# Patient Record
Sex: FEMALE | Race: OTHER
[De-identification: names, ages, dates, MRNs, and addresses within clinical notes are randomized per-mention and may not be internally consistent; named-entity substitution may affect disease eponyms.]

---

## 2018-11-14 ENCOUNTER — HOSPITAL ENCOUNTER (OUTPATIENT)
Dept: HOSPITAL 72 - SUR | Age: 78
Discharge: HOME | End: 2018-11-14
Payer: COMMERCIAL

## 2018-11-14 VITALS — DIASTOLIC BLOOD PRESSURE: 54 MMHG | SYSTOLIC BLOOD PRESSURE: 134 MMHG

## 2018-11-14 VITALS — DIASTOLIC BLOOD PRESSURE: 72 MMHG | SYSTOLIC BLOOD PRESSURE: 147 MMHG

## 2018-11-14 VITALS — SYSTOLIC BLOOD PRESSURE: 130 MMHG | DIASTOLIC BLOOD PRESSURE: 58 MMHG

## 2018-11-14 VITALS — DIASTOLIC BLOOD PRESSURE: 61 MMHG | SYSTOLIC BLOOD PRESSURE: 130 MMHG

## 2018-11-14 VITALS — HEIGHT: 57 IN | WEIGHT: 125 LBS | BODY MASS INDEX: 26.97 KG/M2

## 2018-11-14 VITALS — SYSTOLIC BLOOD PRESSURE: 123 MMHG | DIASTOLIC BLOOD PRESSURE: 54 MMHG

## 2018-11-14 VITALS — DIASTOLIC BLOOD PRESSURE: 51 MMHG | SYSTOLIC BLOOD PRESSURE: 130 MMHG

## 2018-11-14 VITALS — DIASTOLIC BLOOD PRESSURE: 58 MMHG | SYSTOLIC BLOOD PRESSURE: 128 MMHG

## 2018-11-14 DIAGNOSIS — I10: ICD-10-CM

## 2018-11-14 DIAGNOSIS — Z88.6: ICD-10-CM

## 2018-11-14 DIAGNOSIS — H35.342: Primary | ICD-10-CM

## 2018-11-14 DIAGNOSIS — Z90.710: ICD-10-CM

## 2018-11-14 DIAGNOSIS — Z90.89: ICD-10-CM

## 2018-11-14 DIAGNOSIS — M19.90: ICD-10-CM

## 2018-11-14 PROCEDURE — 94003 VENT MGMT INPAT SUBQ DAY: CPT

## 2018-11-14 PROCEDURE — 94150 VITAL CAPACITY TEST: CPT

## 2018-11-14 PROCEDURE — 67042 VIT FOR MACULAR HOLE: CPT

## 2018-11-14 RX ADMIN — CYCLOPENTOLATE HYDROCHLORIDE SCH DROP: 10 SOLUTION OPHTHALMIC at 10:55

## 2018-11-14 RX ADMIN — PHENYLEPHRINE HYDROCHLORIDE SCH DROP: 25 SOLUTION/ DROPS OPHTHALMIC at 11:00

## 2018-11-14 RX ADMIN — CYCLOPENTOLATE HYDROCHLORIDE SCH DROP: 10 SOLUTION OPHTHALMIC at 11:00

## 2018-11-14 RX ADMIN — PHENYLEPHRINE HYDROCHLORIDE SCH DROP: 25 SOLUTION/ DROPS OPHTHALMIC at 10:44

## 2018-11-14 RX ADMIN — PHENYLEPHRINE HYDROCHLORIDE SCH DROP: 25 SOLUTION/ DROPS OPHTHALMIC at 10:55

## 2018-11-14 RX ADMIN — CYCLOPENTOLATE HYDROCHLORIDE SCH DROP: 10 SOLUTION OPHTHALMIC at 10:44

## 2018-11-14 NOTE — PRE-PROCEDURE NOTE/ATTESTATION
Pre-Procedure Note/Attestation


Complete Prior to Procedure


Planned Procedure:  left


Procedure Narrative:


23G PPV/MP/Gas left eye





Indications for Procedure


Pre-Operative Diagnosis:


macular hole left eye





Attestation


I attest that I discussed the nature of the procedure; its benefits; risks and 

complications; and alternatives (and the risks and benefits of such alternatives

), prior to the procedure, with the patient (or the patient's legal 

representative).





I attest that, if there was a reasonable possibility of needing a blood 

transfusion, the patient (or the patient's legal representative) was given the 

[facility] standardized written summary, pursuant 

to the Daryl [place] Blood Safety Act ([place] Health and Safety Code # 1645, as 

amended).





I attest that I re-evaluated the patient just prior to the surgery and that 

there has been no change in the patient's H&P, except as documented below:











Andrew Vazquez MD Nov 14, 2018 11:19

## 2018-11-14 NOTE — IMMEDIATE POST-OP EVALUATION
Immediate Post-Op Evalulation


Immediate Post-Op Evalulation


Procedure:  LEFT eye vitrectomy, membrane stripping, gas injection


Date of Evaluation:  Nov 14, 2018


Time of Evaluation:  14:25


IV Fluids:   ml


Blood Pressure Systolic:  130


Blood Pressure Diastolic:  58


Pulse Rate:  72


Respiratory Rate:  22


O2 Sat by Pulse Oximetry:  100


Temperature (Fahrenheit):  98.5


Pain Score (1-10):  0


Nausea:  No


Vomiting:  No


Complications


none


Patient Status:  awake, reacts, patent


Hydration Status:  adequate


Given Within 1 Hr of Incision:  Aaliyah Nino CRNA Nov 14, 2018 14:32

## 2018-11-14 NOTE — BRIEF OPERATIVE NOTE
Immediate Post Operative Note


Operative Note


Chief Complaint:  blurred vision left eye


Pre-op Diagnosis:


macular hole left eye


Procedure:


23G PPV/MP/20%SF6 left eye


Post-op Diagnosis:


macular hole left eye


Post-op Diagnosis:  same as pre-op


Findings:  consistent w/pre-op dx studies


Surgeon:  Andrew Vazquez


Anesthesia:  local


Specimen:  none


Complications:  none


Condition:  stable


Fluids:  <500cc


Estimated Blood Loss:  none


Drains:  none


Implant(s) used?:  Andrew Lepe MD Nov 14, 2018 14:27

## 2018-11-14 NOTE — ANETHESIA PREOPERATIVE EVAL
Anesthesia Pre-op PMH/ROS


General


Date of Evaluation:  Nov 14, 2018


Time of Evaluation:  12:33


Anesthesiologist:  Aaliyah Webb CRNA


ASA Score:  ASA 2


Mallampati Score


Class I : Soft palate, uvula, fauces, pillars visible


Class II: Soft palate, uvula, fauces visible


Class III: Soft palate, base of uvula visible


Class IV: Only hard plate visible


Mallampati Classification:  Class II


Surgeon:  George


Diagnosis:  LEFT eye macular hole


Surgical Procedure:  LEFT eye vitrectomy, membrane stripping, gas injection


Anesthesia History:  none


Family History:  no anesthesia problems


Allergies:  


Coded Allergies:  


     IBUPROFEN (Verified  Allergy, Intermediate, 11/14/18)


 DIZZINESS


Medications:  see eMAR


Patient NPO?:  Yes


NPO Date:  Nov 14, 2018


NPO Time:  00:00





Past Medical History


Cardiovascular:  Reports: HTN; 


   Denies: CAD, MI, valve dz, arrhythmia, other


Pulmonary:  Denies: asthma, COPD, RO, other


Gastrointestinal/Genitourinary:  Denies: GERD, CRI, ESRD, other


Neurologic/Psychiatric:  Denies: dementia, CVA, depression/anxiety, TIA, other


Endocrine:  Denies: DM, hypothyroidism, steroids, other


HEENT:  Reports: other - LEFT eye macular hole; 


   Denies: cataract (L), cataract (R), glaucoma, Lummi (L), Lummi (R)


Hematology/Immune:  Denies: anemia, DVT, bleeding disorder, other


Musculoskeletal/Integumentary:  Reports: OA; 


   Denies: RA, DJD, DDD, edema, other


PMH Narrative:


as above


PSxH Narrative:


C/S, appendectomy, hysterectomy





Anesthesia Pre-op Phys. Exam


Physician Exam





Last Vital Signs








  Date Time  Temp Pulse Resp B/P (MAP) Pulse Ox O2 Delivery O2 Flow Rate FiO2


 


11/14/18 10:53 98.1 18 18 147/72 99 Room Air  








Constitutional:  NAD


Neurologic:  CN 2-12 intact


Cardiovascular:  RRR


Respiratory:  CTA


Gastrointestinal:  S/NT/ND





Airway Exam


Mallampati Score:  Class II


TMD:  3 FB


ROM:  full


Teeth:  missing


Dentures:  upper, lower





Anesthesia Pre-op A/P


Labs


reviewed, see chart





Studies


Pre-op Studies:  EKG - NSR





Risk Assessment & Plan


Assessment:


ASA 2, ok to ptoceed


Plan:


MAC


Status Change Before Surgery:  No





Pre-Antibiotics


Given Within 1 Hr of Incision:  No











Koempel,Aaliyah CRNA Nov 14, 2018 13:05

## 2018-11-14 NOTE — 48 HOUR POST ANESTHESIA EVAL
Post Anesthesia Evaluation


Procedure:  LEFT eye vitrectomy, membrane stripping, gas injection


Date of Evaluation:  Nov 14, 2018


Time of Evaluation:  14:35


Blood Pressure Systolic:  134


0:  54


Pulse Rate:  76


Respiratory Rate:  20


Temperature (Fahrenheit):  98.5


O2 Sat by Pulse Oximetry:  98


Airway:  patent


Nausea:  No


Vomiting:  No


Pain Intensity:  0


Hydration Status:  adequate


Cardiopulmonary Status:


stable


Mental Status/LOC:  patient returned to baseline


Follow-up Care/Observations:


per opthalmology


Post-Anesthesia Complications:


none


Follow-up care needed:  ready to discharge











Aaliyah Webb CRNA Nov 14, 2018 14:35

## 2018-11-14 NOTE — OPERATIVE NOTE - DICTATED
DATE OF OPERATION:  11/14/2018

SURGEON:  Andrew Vazquez M.D.



PREOPERATIVE DIAGNOSIS:  Macular hole, left eye.



POSTOPERATIVE DIAGNOSIS:  Macular hole, left eye.



NAME OF OPERATION:  A 23-gauge pars plana vitrectomy, membrane peel, and

20% SF6 gas, left eye.



ANESTHESIA:  Local.



BLOOD LOSS:  None.



COMPLICATIONS:  None.



INDICATIONS:  The patient is a 78-year-old female with history of blurred

vision in her left eye.  She was found to have a macular hole.  The

findings were discussed with the patient as well as the risks, benefits,

and alternatives to the above-named procedure.  The patient wishes to

proceed with surgery.  The patient understands the risks include, but are

not limited to, loss of vision and loss of the eye.  Informed consent was

obtained.



DESCRIPTION OF THE PROCEDURE:  On the day of the procedure, the left eye

was noted to be the operative eye and marked.  The patient received 3 sets

of the standard preoperative eye drops in the holding area.  The patient

was then brought to the operating room with appropriate anesthesia

monitors placed.  The left eye was again identified as the operative eye.

During time-out, the nonoperative eye was patched and shielded.  The

operative eye then received a retrobulbar block consisting of 1 to 1

mixture of 2% lidocaine without epinephrine and 0.75% bupivacaine for a

total of 5 mL. Anesthesia and akinesia were noted to be obtained.  The

operative eye was then prepped and draped in the usual sterile ophthalmic

fashion.  A lid speculum was placed in the operative eye.  A 22-gauge

cannulas were placed with infusion located inferotemporally.  A standard 3

port pars plana vitrectomy was then performed.  Kenalog was then used to

stain the posterior hyaloid and a posterior vitreous detachment was

induced with the vitreous cutter.  ICG dye was then used to stain the

internal limiting membrane and the residual dye was then removed.

Intraocular forceps were then used to grasp the internal limiting membrane

and peeled from the surface of the macula along with a macular pucker.

The retina was inspected.  There were no retinal breaks or tears.  An

air-fluid exchange was then performed and 20% SF6 gas was infused into the

eye.  The 23-gauge cannulas were then removed.  The wounds were checked

for leakage and found to be watertight.  The intraocular pressure was

palpated and found to be within normal limits.  The patient then received

subconjunctival injections of vancomycin and dexamethasone.  The lid

speculum and drapes were then removed.  Maxitrol ointment and atropine eye

drops were applied to the eye and the eye was patched and shielded.  The

patient tolerated the procedure well and was returned to the postoperative

care area in good condition.









  ______________________________________________

  Andrew Vazquez M.D.





DR:  Alcon

D:  11/14/2018 14:32

T:  11/14/2018 21:17

JOB#:  [id number]

CC: